# Patient Record
Sex: MALE | Race: WHITE
[De-identification: names, ages, dates, MRNs, and addresses within clinical notes are randomized per-mention and may not be internally consistent; named-entity substitution may affect disease eponyms.]

---

## 2023-03-12 ENCOUNTER — HOSPITAL ENCOUNTER (EMERGENCY)
Dept: HOSPITAL 54 - ER | Age: 43
Discharge: HOME | End: 2023-03-12
Payer: SELF-PAY

## 2023-03-12 VITALS — WEIGHT: 178 LBS | HEIGHT: 73 IN | BODY MASS INDEX: 23.59 KG/M2

## 2023-03-12 VITALS — SYSTOLIC BLOOD PRESSURE: 145 MMHG | DIASTOLIC BLOOD PRESSURE: 97 MMHG

## 2023-03-12 DIAGNOSIS — F11.13: Primary | ICD-10-CM

## 2023-03-12 DIAGNOSIS — Z60.2: ICD-10-CM

## 2023-03-12 SDOH — SOCIAL STABILITY - SOCIAL INSECURITY: PROBLEMS RELATED TO LIVING ALONE: Z60.2

## 2023-03-12 NOTE — NUR
BIBRA 878 FOR DRUG WITHDRAWAL. PT STATES "I QUIT COLD TURKEY FOR 10 DAYS". 
PLACED IN BED, AAOX4, BREATHING EVEN AND UNLABORED SATURATING AT 97%RA